# Patient Record
Sex: FEMALE | Race: WHITE | NOT HISPANIC OR LATINO | Employment: UNEMPLOYED | URBAN - METROPOLITAN AREA
[De-identification: names, ages, dates, MRNs, and addresses within clinical notes are randomized per-mention and may not be internally consistent; named-entity substitution may affect disease eponyms.]

---

## 2018-12-19 ENCOUNTER — HOSPITAL ENCOUNTER (EMERGENCY)
Facility: HOSPITAL | Age: 48
Discharge: HOME | End: 2018-12-19
Attending: EMERGENCY MEDICINE

## 2018-12-19 VITALS
HEIGHT: 61 IN | BODY MASS INDEX: 25.81 KG/M2 | TEMPERATURE: 99 F | WEIGHT: 136.69 LBS | HEART RATE: 100 BPM | RESPIRATION RATE: 20 BRPM | OXYGEN SATURATION: 96 % | DIASTOLIC BLOOD PRESSURE: 77 MMHG | SYSTOLIC BLOOD PRESSURE: 112 MMHG

## 2018-12-19 DIAGNOSIS — F17.200 CURRENT SMOKER: ICD-10-CM
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
## 2018-12-19 PROCEDURE — 99281 EMR DPT VST MAYX REQ PHY/QHP: CPT
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
## 2018-12-20 NOTE — ED NOTES
Called [facility] and they spoke with pt and said no beds avail at this time. Pt provided with  [facility] number which did not answer then also [facility] was busy. [facility] no answer. Pt okay with discharge to [facility] for use of  Phone to continue to call for placement.  Offered food and drink but only requested water. Maricruz Johnson spoke with Mela Nava nurse manager that did get okay from admin.for use of [employer] for transportation to a shelter if needed.